# Patient Record
(demographics unavailable — no encounter records)

---

## 2024-10-07 NOTE — HISTORY OF PRESENT ILLNESS
[FreeTextEntry1] : 63-year-old female with PMH of HLD, chronic hep B, osteoporosis, s/p ureteroscopy with laser lithotripsy in 2016 who presents for pre-op cardiac clearance for cystoscopy with ureteroscopy with laser lithotripsy and stent placement on 10/15/24 in [facility] by Dr. Chon Rodríguez.   Patient denies CP, SOB, palpitations, or lightheadedness. Patient denies h/o syncope. Pt has daily walking exercise for 4-6 hours without limitation. She is able to walk 2-3 flights of stairs without limitation. No previous cardiac history. No history of stroke or CKD. Today's /79 P 96.   Pt is on atorvastatin 20mg and Vemlidy 25mg.

## 2024-10-07 NOTE — ASSESSMENT
[FreeTextEntry1] : 63-year-old female with PMH of HLD, chronic hep B, osteoporosis, s/p ureteroscopy with laser lithotripsy in 2016 who presents for pre-op cardiac clearance.  1) Pre-op cardiac clearance, for cystoscopy with ureteroscopy with laser lithotripsy and stent placement on 10/15/24 in [facility] by Dr. Chon Rodríguez 2) HLD - EKG 10/7/24 showed sinus rhythm with borderline LAE - I advised pt to undergo TTE 10/7/24 which showed normal LVEF 60-65% with normal diastolic function, normal RV size/function, mild MR, mild TR, and no AI - Her RCRI score is 0 which puts her at low cardiac risk for planned urologic surgery - There is no evidence of active ACS, arrhythmia, clinical heart failure, or significant valvular disease - She is medically optimized from cardiac standpoint and may proceed to planned cystoscopy with ureteroscopy with laser lithotripsy and stent placement without further cardiac testing.  3) Follow-up, as needed